# Patient Record
Sex: MALE | Race: BLACK OR AFRICAN AMERICAN | URBAN - METROPOLITAN AREA
[De-identification: names, ages, dates, MRNs, and addresses within clinical notes are randomized per-mention and may not be internally consistent; named-entity substitution may affect disease eponyms.]

---

## 2021-06-08 ENCOUNTER — OFFICE VISIT (OUTPATIENT)
Dept: URBAN - METROPOLITAN AREA CLINIC 84 | Facility: CLINIC | Age: 51
End: 2021-06-08
Payer: COMMERCIAL

## 2021-06-08 ENCOUNTER — WEB ENCOUNTER (OUTPATIENT)
Dept: URBAN - METROPOLITAN AREA CLINIC 84 | Facility: CLINIC | Age: 51
End: 2021-06-08

## 2021-06-08 DIAGNOSIS — Z98.890 HISTORY OF COLON SURGERY: ICD-10-CM

## 2021-06-08 DIAGNOSIS — E66.9 OBESITY (BMI 30-39.9): ICD-10-CM

## 2021-06-08 DIAGNOSIS — K92.1 HEMATOCHEZIA: ICD-10-CM

## 2021-06-08 DIAGNOSIS — Z87.19 HISTORY OF DIVERTICULITIS: ICD-10-CM

## 2021-06-08 DIAGNOSIS — Z95.5 HISTORY OF CORONARY ANGIOPLASTY WITH INSERTION OF STENT: ICD-10-CM

## 2021-06-08 DIAGNOSIS — E11.9 TYPE 2 DIABETES MELLITUS WITHOUT COMPLICATION, WITHOUT LONG-TERM CURRENT USE OF INSULIN: ICD-10-CM

## 2021-06-08 DIAGNOSIS — R10.84 GENERALIZED ABDOMINAL PAIN: ICD-10-CM

## 2021-06-08 PROBLEM — 313436004: Status: ACTIVE | Noted: 2021-06-08

## 2021-06-08 PROBLEM — 162864005: Status: ACTIVE | Noted: 2021-06-08

## 2021-06-08 PROCEDURE — 99204 OFFICE O/P NEW MOD 45 MIN: CPT | Performed by: INTERNAL MEDICINE

## 2021-06-08 RX ORDER — METFORMIN ER 500 MG 500 MG/1
(PRIOR AUTH#:000006802282) TABLET ORAL
Qty: 30 DELAYED RELEASE TABLET | Status: ACTIVE | COMMUNITY

## 2021-06-08 RX ORDER — METOPROLOL SUCCINATE 25 MG/1
(PRIOR AUTH#:000006827528) TABLET, EXTENDED RELEASE ORAL
Qty: 30 DELAYED RELEASE TABLET | Status: ACTIVE | COMMUNITY

## 2021-06-08 RX ORDER — ROSUVASTATIN CALCIUM 5 MG/1
(PRIOR AUTH#:000006802291) TABLET, FILM COATED ORAL
Qty: 30 DELAYED RELEASE TABLET | Status: ACTIVE | COMMUNITY

## 2021-06-08 RX ORDER — AZITHROMYCIN MONOHYDRATE 10 MG/ML
(PRIOR AUTH#:000006824793) SOLUTION/ DROPS OPHTHALMIC
Qty: 2 UNSPECIFIED | Status: ACTIVE | COMMUNITY

## 2021-06-11 ENCOUNTER — OFFICE VISIT (OUTPATIENT)
Dept: URBAN - METROPOLITAN AREA SURGERY CENTER 20 | Facility: SURGERY CENTER | Age: 51
End: 2021-06-11
Payer: COMMERCIAL

## 2021-06-11 ENCOUNTER — TELEPHONE ENCOUNTER (OUTPATIENT)
Dept: URBAN - METROPOLITAN AREA CLINIC 84 | Facility: CLINIC | Age: 51
End: 2021-06-11

## 2021-06-11 ENCOUNTER — CLAIMS CREATED FROM THE CLAIM WINDOW (OUTPATIENT)
Dept: URBAN - METROPOLITAN AREA CLINIC 4 | Facility: CLINIC | Age: 51
End: 2021-06-11
Payer: COMMERCIAL

## 2021-06-11 DIAGNOSIS — K51.911 ULCERATIVE COLITIS, UNSPECIFIED WITH RECTAL BLEEDING: ICD-10-CM

## 2021-06-11 DIAGNOSIS — K51.20 CHRONIC ULCERATIVE PROCTITIS: ICD-10-CM

## 2021-06-11 PROCEDURE — G8907 PT DOC NO EVENTS ON DISCHARG: HCPCS | Performed by: INTERNAL MEDICINE

## 2021-06-11 PROCEDURE — 88305 TISSUE EXAM BY PATHOLOGIST: CPT | Performed by: PATHOLOGY

## 2021-06-11 PROCEDURE — 45380 COLONOSCOPY AND BIOPSY: CPT | Performed by: INTERNAL MEDICINE

## 2021-06-11 RX ORDER — METOPROLOL SUCCINATE 25 MG/1
(PRIOR AUTH#:000006827528) TABLET, EXTENDED RELEASE ORAL
Qty: 30 DELAYED RELEASE TABLET | Status: ACTIVE | COMMUNITY

## 2021-06-11 RX ORDER — AZITHROMYCIN MONOHYDRATE 10 MG/ML
(PRIOR AUTH#:000006824793) SOLUTION/ DROPS OPHTHALMIC
Qty: 2 UNSPECIFIED | Status: ACTIVE | COMMUNITY

## 2021-06-11 RX ORDER — HYDROCORTISONE ACETATE 25 MG/1
1 SUPPOSITORY SUPPOSITORY RECTAL TWICE A DAY
Qty: 28 | Refills: 1 | OUTPATIENT
Start: 2021-06-11 | End: 2021-07-09

## 2021-06-11 RX ORDER — METFORMIN ER 500 MG 500 MG/1
(PRIOR AUTH#:000006802282) TABLET ORAL
Qty: 30 DELAYED RELEASE TABLET | Status: ACTIVE | COMMUNITY

## 2021-06-11 RX ORDER — ROSUVASTATIN CALCIUM 5 MG/1
(PRIOR AUTH#:000006802291) TABLET, FILM COATED ORAL
Qty: 30 DELAYED RELEASE TABLET | Status: ACTIVE | COMMUNITY

## 2021-08-13 ENCOUNTER — TELEPHONE ENCOUNTER (OUTPATIENT)
Dept: URBAN - METROPOLITAN AREA CLINIC 92 | Facility: CLINIC | Age: 51
End: 2021-08-13

## 2021-08-17 ENCOUNTER — DASHBOARD ENCOUNTERS (OUTPATIENT)
Age: 51
End: 2021-08-17

## 2021-08-24 ENCOUNTER — OFFICE VISIT (OUTPATIENT)
Dept: URBAN - METROPOLITAN AREA CLINIC 84 | Facility: CLINIC | Age: 51
End: 2021-08-24

## 2021-08-24 RX ORDER — METOPROLOL SUCCINATE 25 MG/1
(PRIOR AUTH#:000006827528) TABLET, EXTENDED RELEASE ORAL
Qty: 30 DELAYED RELEASE TABLET | COMMUNITY

## 2021-08-24 RX ORDER — METFORMIN ER 500 MG 500 MG/1
(PRIOR AUTH#:000006802282) TABLET ORAL
Qty: 30 DELAYED RELEASE TABLET | COMMUNITY

## 2021-08-24 RX ORDER — AZITHROMYCIN MONOHYDRATE 10 MG/ML
(PRIOR AUTH#:000006824793) SOLUTION/ DROPS OPHTHALMIC
Qty: 2 UNSPECIFIED | COMMUNITY

## 2021-08-24 RX ORDER — ROSUVASTATIN CALCIUM 5 MG/1
(PRIOR AUTH#:000006802291) TABLET, FILM COATED ORAL
Qty: 30 DELAYED RELEASE TABLET | COMMUNITY

## 2022-04-30 ENCOUNTER — TELEPHONE ENCOUNTER (OUTPATIENT)
Dept: URBAN - METROPOLITAN AREA CLINIC 121 | Facility: CLINIC | Age: 52
End: 2022-04-30

## 2022-05-01 ENCOUNTER — TELEPHONE ENCOUNTER (OUTPATIENT)
Dept: URBAN - METROPOLITAN AREA CLINIC 121 | Facility: CLINIC | Age: 52
End: 2022-05-01

## 2025-01-06 ENCOUNTER — LAB OUTSIDE AN ENCOUNTER (OUTPATIENT)
Dept: URBAN - METROPOLITAN AREA CLINIC 25 | Facility: CLINIC | Age: 55
End: 2025-01-06

## 2025-01-06 ENCOUNTER — OFFICE VISIT (OUTPATIENT)
Dept: URBAN - METROPOLITAN AREA CLINIC 25 | Facility: CLINIC | Age: 55
End: 2025-01-06
Payer: COMMERCIAL

## 2025-01-06 VITALS
HEART RATE: 67 BPM | BODY MASS INDEX: 31.22 KG/M2 | SYSTOLIC BLOOD PRESSURE: 119 MMHG | WEIGHT: 223 LBS | DIASTOLIC BLOOD PRESSURE: 80 MMHG | HEIGHT: 71 IN

## 2025-01-06 DIAGNOSIS — R12 HEARTBURN: ICD-10-CM

## 2025-01-06 DIAGNOSIS — R10.84 GENERALIZED ABDOMINAL PAIN: ICD-10-CM

## 2025-01-06 DIAGNOSIS — R14.0 ABDOMINAL BLOATING: ICD-10-CM

## 2025-01-06 DIAGNOSIS — K57.30 DIVERTICULOSIS OF COLON: ICD-10-CM

## 2025-01-06 DIAGNOSIS — R11.2 NAUSEA AND VOMITING, UNSPECIFIED VOMITING TYPE: ICD-10-CM

## 2025-01-06 DIAGNOSIS — K92.1 HEMATOCHEZIA: ICD-10-CM

## 2025-01-06 DIAGNOSIS — E66.9 OBESITY, UNSPECIFIED CLASS, UNSPECIFIED OBESITY TYPE, UNSPECIFIED WHETHER SERIOUS COMORBIDITY PRESENT: ICD-10-CM

## 2025-01-06 PROBLEM — 733657002: Status: ACTIVE | Noted: 2025-01-06

## 2025-01-06 PROCEDURE — 99204 OFFICE O/P NEW MOD 45 MIN: CPT | Performed by: INTERNAL MEDICINE

## 2025-01-06 RX ORDER — METFORMIN ER 500 MG 500 MG/1
(PRIOR AUTH#:000006802282) TABLET ORAL
Qty: 30 DELAYED RELEASE TABLET | COMMUNITY

## 2025-01-06 RX ORDER — ROSUVASTATIN CALCIUM 5 MG/1
(PRIOR AUTH#:000006802291) TABLET, FILM COATED ORAL
Qty: 30 DELAYED RELEASE TABLET | COMMUNITY

## 2025-01-06 RX ORDER — AZITHROMYCIN MONOHYDRATE 10 MG/ML
(PRIOR AUTH#:000006824793) SOLUTION/ DROPS OPHTHALMIC
Qty: 2 UNSPECIFIED | COMMUNITY

## 2025-01-06 RX ORDER — METOPROLOL SUCCINATE 25 MG/1
(PRIOR AUTH#:000006827528) TABLET, EXTENDED RELEASE ORAL
Qty: 30 DELAYED RELEASE TABLET | COMMUNITY

## 2025-01-10 ENCOUNTER — OFFICE VISIT (OUTPATIENT)
Dept: URBAN - METROPOLITAN AREA SURGERY CENTER 20 | Facility: SURGERY CENTER | Age: 55
End: 2025-01-10

## 2025-01-14 ENCOUNTER — OFFICE VISIT (OUTPATIENT)
Dept: URBAN - METROPOLITAN AREA SURGERY CENTER 20 | Facility: SURGERY CENTER | Age: 55
End: 2025-01-14

## 2025-01-21 ENCOUNTER — CLAIMS CREATED FROM THE CLAIM WINDOW (OUTPATIENT)
Dept: URBAN - METROPOLITAN AREA CLINIC 4 | Facility: CLINIC | Age: 55
End: 2025-01-21
Payer: COMMERCIAL

## 2025-01-21 ENCOUNTER — CLAIMS CREATED FROM THE CLAIM WINDOW (OUTPATIENT)
Dept: URBAN - METROPOLITAN AREA SURGERY CENTER 20 | Facility: SURGERY CENTER | Age: 55
End: 2025-01-21
Payer: COMMERCIAL

## 2025-01-21 DIAGNOSIS — R10.84 ABDOMINAL CRAMPING, GENERALIZED: ICD-10-CM

## 2025-01-21 DIAGNOSIS — R11.2 ACUTE NAUSEA WITH NONBILIOUS VOMITING: ICD-10-CM

## 2025-01-21 DIAGNOSIS — K31.89 OTHER DISEASES OF STOMACH AND DUODENUM: ICD-10-CM

## 2025-01-21 DIAGNOSIS — K22.2 ESOPHAGEAL OBSTRUCTION: ICD-10-CM

## 2025-01-21 DIAGNOSIS — K22.2 SCHATZKI'S RING: ICD-10-CM

## 2025-01-21 DIAGNOSIS — R14.0 ABDOMINAL BLOATING: ICD-10-CM

## 2025-01-21 DIAGNOSIS — K29.50 MILD CHRONIC GASTRITIS: ICD-10-CM

## 2025-01-21 DIAGNOSIS — K44.9 HIATAL HERNIA, SMALL: ICD-10-CM

## 2025-01-21 DIAGNOSIS — K29.70 GASTRITIS: ICD-10-CM

## 2025-01-21 DIAGNOSIS — K29.70 GASTRITIS, UNSPECIFIED, WITHOUT BLEEDING: ICD-10-CM

## 2025-01-21 DIAGNOSIS — R12 BURNING REFLUX: ICD-10-CM

## 2025-01-21 PROCEDURE — 43239 EGD BIOPSY SINGLE/MULTIPLE: CPT | Performed by: INTERNAL MEDICINE

## 2025-01-21 PROCEDURE — 88305 TISSUE EXAM BY PATHOLOGIST: CPT | Performed by: PATHOLOGY

## 2025-01-21 PROCEDURE — 88342 IMHCHEM/IMCYTCHM 1ST ANTB: CPT | Performed by: PATHOLOGY

## 2025-01-21 PROCEDURE — 00731 ANES UPR GI NDSC PX NOS: CPT | Performed by: NURSE ANESTHETIST, CERTIFIED REGISTERED

## 2025-01-21 PROCEDURE — 43450 DILATE ESOPHAGUS 1/MULT PASS: CPT | Performed by: INTERNAL MEDICINE

## 2025-01-21 RX ORDER — ROSUVASTATIN CALCIUM 5 MG/1
(PRIOR AUTH#:000006802291) TABLET, FILM COATED ORAL
Qty: 30 DELAYED RELEASE TABLET | COMMUNITY

## 2025-01-21 RX ORDER — METFORMIN ER 500 MG 500 MG/1
(PRIOR AUTH#:000006802282) TABLET ORAL
Qty: 30 DELAYED RELEASE TABLET | COMMUNITY

## 2025-01-21 RX ORDER — METOPROLOL SUCCINATE 25 MG/1
(PRIOR AUTH#:000006827528) TABLET, EXTENDED RELEASE ORAL
Qty: 30 DELAYED RELEASE TABLET | COMMUNITY

## 2025-01-21 RX ORDER — AZITHROMYCIN MONOHYDRATE 10 MG/ML
(PRIOR AUTH#:000006824793) SOLUTION/ DROPS OPHTHALMIC
Qty: 2 UNSPECIFIED | COMMUNITY

## 2025-01-28 ENCOUNTER — TELEPHONE ENCOUNTER (OUTPATIENT)
Dept: URBAN - METROPOLITAN AREA CLINIC 84 | Facility: CLINIC | Age: 55
End: 2025-01-28

## 2025-01-28 ENCOUNTER — CLAIMS CREATED FROM THE CLAIM WINDOW (OUTPATIENT)
Dept: URBAN - METROPOLITAN AREA CLINIC 4 | Facility: CLINIC | Age: 55
End: 2025-01-28
Payer: COMMERCIAL

## 2025-01-28 ENCOUNTER — LAB OUTSIDE AN ENCOUNTER (OUTPATIENT)
Dept: URBAN - METROPOLITAN AREA CLINIC 84 | Facility: CLINIC | Age: 55
End: 2025-01-28

## 2025-01-28 ENCOUNTER — OFFICE VISIT (OUTPATIENT)
Dept: URBAN - METROPOLITAN AREA SURGERY CENTER 20 | Facility: SURGERY CENTER | Age: 55
End: 2025-01-28

## 2025-01-28 DIAGNOSIS — K51.40 INFLAMMATORY POLYPS OF COLON WITHOUT COMPLICATIONS: ICD-10-CM

## 2025-01-28 DIAGNOSIS — K51.80 OTHER ULCERATIVE COLITIS WITHOUT COMPLICATIONS: ICD-10-CM

## 2025-01-28 PROCEDURE — 88305 TISSUE EXAM BY PATHOLOGIST: CPT | Performed by: PATHOLOGY

## 2025-01-28 RX ORDER — HYDROCORTISONE ACETATE 25 MG/1
1 SUPPOSITORY SUPPOSITORY RECTAL TWICE A DAY
Qty: 28 | Refills: 1 | OUTPATIENT
Start: 2025-01-28 | End: 2025-02-25

## 2025-01-28 RX ORDER — METFORMIN ER 500 MG 500 MG/1
(PRIOR AUTH#:000006802282) TABLET ORAL
Qty: 30 DELAYED RELEASE TABLET | COMMUNITY

## 2025-01-28 RX ORDER — METOPROLOL SUCCINATE 25 MG/1
(PRIOR AUTH#:000006827528) TABLET, EXTENDED RELEASE ORAL
Qty: 30 DELAYED RELEASE TABLET | COMMUNITY

## 2025-01-28 RX ORDER — ROSUVASTATIN CALCIUM 5 MG/1
(PRIOR AUTH#:000006802291) TABLET, FILM COATED ORAL
Qty: 30 DELAYED RELEASE TABLET | COMMUNITY

## 2025-01-28 RX ORDER — AZITHROMYCIN MONOHYDRATE 10 MG/ML
(PRIOR AUTH#:000006824793) SOLUTION/ DROPS OPHTHALMIC
Qty: 2 UNSPECIFIED | COMMUNITY

## 2025-02-24 ENCOUNTER — OFFICE VISIT (OUTPATIENT)
Dept: URBAN - METROPOLITAN AREA CLINIC 84 | Facility: CLINIC | Age: 55
End: 2025-02-24

## 2025-02-24 RX ORDER — AZITHROMYCIN MONOHYDRATE 10 MG/ML
(PRIOR AUTH#:000006824793) SOLUTION/ DROPS OPHTHALMIC
Qty: 2 UNSPECIFIED | Status: ACTIVE | COMMUNITY

## 2025-02-24 RX ORDER — METOPROLOL SUCCINATE 25 MG/1
(PRIOR AUTH#:000006827528) TABLET, EXTENDED RELEASE ORAL
Qty: 30 DELAYED RELEASE TABLET | Status: ACTIVE | COMMUNITY

## 2025-02-24 RX ORDER — ROSUVASTATIN CALCIUM 5 MG/1
(PRIOR AUTH#:000006802291) TABLET, FILM COATED ORAL
Qty: 30 DELAYED RELEASE TABLET | Status: ACTIVE | COMMUNITY

## 2025-02-24 RX ORDER — HYDROCORTISONE ACETATE 25 MG/1
1 SUPPOSITORY SUPPOSITORY RECTAL TWICE A DAY
Qty: 28 | Refills: 1 | Status: ACTIVE | COMMUNITY
Start: 2025-01-28 | End: 2025-02-25

## 2025-02-24 RX ORDER — METFORMIN ER 500 MG 500 MG/1
(PRIOR AUTH#:000006802282) TABLET ORAL
Qty: 30 DELAYED RELEASE TABLET | Status: ACTIVE | COMMUNITY

## 2025-02-25 ENCOUNTER — OFFICE VISIT (OUTPATIENT)
Dept: URBAN - METROPOLITAN AREA CLINIC 84 | Facility: CLINIC | Age: 55
End: 2025-02-25

## 2025-03-12 ENCOUNTER — TELEPHONE ENCOUNTER (OUTPATIENT)
Dept: URBAN - METROPOLITAN AREA CLINIC 84 | Facility: CLINIC | Age: 55
End: 2025-03-12